# Patient Record
Sex: FEMALE | Race: WHITE | NOT HISPANIC OR LATINO | Employment: OTHER | URBAN - METROPOLITAN AREA
[De-identification: names, ages, dates, MRNs, and addresses within clinical notes are randomized per-mention and may not be internally consistent; named-entity substitution may affect disease eponyms.]

---

## 2024-05-14 NOTE — PROGRESS NOTES
Subjective   Patient ID: Fannie Hoang is a 83 y.o. female who presents for No chief complaint on file..  HPI  This 83-year-old female last seen in this office in May 2023 for sensorineural hearing loss is being seen in follow-up.  She also has some difficulties with ear canal dryness and also has a history of reactive airway problems.  Past audiograms had revealed a high-frequency hearing loss.She had no drop-off in her word understanding during conversations unless there is excessive background noise.  Review of Systems  A 12 point ROS has been reviewed and are negative for complaint except what is stated in the history of present illness and/or past medical history as noted in the EMR    Active Ambulatory Problems     Diagnosis Date Noted    No Active Ambulatory Problems     Resolved Ambulatory Problems     Diagnosis Date Noted    No Resolved Ambulatory Problems     Past Medical History:   Diagnosis Date    Calculus of kidney     Heart disease, unspecified     Iron deficiency     Personal history of malignant neoplasm of breast     Personal history of other diseases of the circulatory system     Personal history of other diseases of the digestive system     Personal history of other diseases of the musculoskeletal system and connective tissue     Personal history of other diseases of the respiratory system     Personal history of other diseases of the respiratory system 09/30/2016    Personal history of other diseases of urinary system     Personal history of other endocrine, nutritional and metabolic disease     Pure hypercholesterolemia, unspecified          No current outpatient medications on file.     Not on File   Objective   Physical Exam  EXAMINATION:     GENERAL GAIL.EARANCE: Alert, in no acute distress, normal pitch and clarity of voice, well-developed and nourished, cooperative.     HEAD/FACE: Normocephalic, atraumatic, normal facial movements and strength, no no tenderness to palpation, no lesions  noted.     SKIN: Normal turgor, no raised or ulcerative lesions, warm and dry to palpation.     EYES: Extraocular motions intact, no nystagmus noted, pupils equal and reactive to light and accommodation, no conjunctivitis.     EARS: Both ears--external ear anatomy is normal without lesions, auditory canals are patent and without skin abrasions or lesions, ear canals are devoid of wax and dry in appearance,,hearing is intact to voice, tympanic membranes are intact with no acute inflammation, light reflexes present, no effusions are noted and no mastoid tenderness found to palpation.     NOSE: No external skin lesions are noted, nares are patent, septum is intact with mild deviation to the lower, sinuses are nontender to palpation bilaterally, no intranasal lesions or inflammation is noted, nasal valve is normal.     OROPHARYNX/ORAL CAVITY: Oropharynx is not inflamed and is without lesions, mucosa of the oral cavity is intact and without lesions, tongue is midline and mobile, no acute dental disease is noted, TMJs are mobile     NECK: No lymphadenopathy is palpated, carotid pulses are intact, neck is supple with full range of motion, no thyroid abnormalities are noted, trachea is midline, no neck masses are palpated.     LYMPHATICS: No cervical adenopathy or supraclavicular adenopathy is palpated.     NEUROLOGIC/PSYCH; alert and oriented, cranial nerves are grossly intact, gait is without falling, no motor deficits are noted.     Her audiogram today revealed mild to low normal hearing up to 1000 Hz bilaterally then a moderate hearing loss is noted from 2 to 6000 Hz moderately severe in the 8000 Hz range.  Discrimination scores are 100% at 55 dB.  Tympanograms are normal.  Assessment/Plan   Problem List Items Addressed This Visit             ICD-10-CM    Chronic rhinitis J31.0    Ear canal dryness, bilateral H61.893    Sensorineural hearing loss, bilateral H90.3    Type 2 diabetes mellitus without complication (Multi)  E11.9     Other Visit Diagnoses         Codes    Sensorineural hearing loss (SNHL) of both ears    -  Primary H90.3             I discussed the present hearing test findings with the patient. Since the last test there has been mild change in the hearing of individual frequencies sound. Discrimination ability remains basically unchanged. It would be advised that a yearly audiogram be done unless symptoms develop in regards to progressive loss, new onset vertigo, or changes regarding tinnitus. Avoidance of loud noise without ear protection is advised. Rehabilitation using hearing aids was discussed as a treatment option.  She would benefit from seeing audiology for a discussion about hearing aids.  If she wishes to do so she can contact the office at any time otherwise she should be seen again in 1 year..    Sourav Rizo DMD, MD 05/14/24 11:57 AM

## 2024-05-16 ENCOUNTER — OFFICE VISIT (OUTPATIENT)
Dept: OTOLARYNGOLOGY | Facility: CLINIC | Age: 84
End: 2024-05-16
Payer: MEDICARE

## 2024-05-16 ENCOUNTER — CLINICAL SUPPORT (OUTPATIENT)
Dept: AUDIOLOGY | Facility: CLINIC | Age: 84
End: 2024-05-16
Payer: MEDICARE

## 2024-05-16 VITALS — BODY MASS INDEX: 28.35 KG/M2 | HEIGHT: 70 IN | WEIGHT: 198 LBS

## 2024-05-16 DIAGNOSIS — H61.893 EAR CANAL DRYNESS, BILATERAL: ICD-10-CM

## 2024-05-16 DIAGNOSIS — H90.3 SENSORINEURAL HEARING LOSS (SNHL) OF BOTH EARS: Primary | ICD-10-CM

## 2024-05-16 DIAGNOSIS — H90.3 SENSORINEURAL HEARING LOSS, BILATERAL: Primary | ICD-10-CM

## 2024-05-16 DIAGNOSIS — E11.9 TYPE 2 DIABETES MELLITUS WITHOUT COMPLICATION, WITHOUT LONG-TERM CURRENT USE OF INSULIN (MULTI): ICD-10-CM

## 2024-05-16 DIAGNOSIS — J31.0 CHRONIC RHINITIS: ICD-10-CM

## 2024-05-16 DIAGNOSIS — H90.3 SENSORINEURAL HEARING LOSS, BILATERAL: ICD-10-CM

## 2024-05-16 PROBLEM — I21.9 MYOCARDIAL INFARCTION (MULTI): Status: ACTIVE | Noted: 2022-12-22

## 2024-05-16 PROBLEM — I10 ESSENTIAL (PRIMARY) HYPERTENSION: Status: ACTIVE | Noted: 2022-12-22

## 2024-05-16 PROBLEM — G93.32 CHRONIC FATIGUE SYNDROME: Status: ACTIVE | Noted: 2024-05-16

## 2024-05-16 PROBLEM — J30.1 CHRONIC SEASONAL ALLERGIC RHINITIS DUE TO POLLEN: Status: ACTIVE | Noted: 2018-08-08

## 2024-05-16 PROBLEM — H10.31 ACUTE BACTERIAL CONJUNCTIVITIS OF RIGHT EYE: Status: ACTIVE | Noted: 2024-05-16

## 2024-05-16 PROBLEM — J45.30 MILD PERSISTENT ASTHMA WITHOUT COMPLICATION (HHS-HCC): Status: ACTIVE | Noted: 2018-08-08

## 2024-05-16 PROBLEM — D63.1 ANEMIA OF CHRONIC RENAL FAILURE, STAGE 3 (MODERATE) (MULTI): Status: ACTIVE | Noted: 2017-10-02

## 2024-05-16 PROBLEM — M17.0 OSTEOARTHRITIS OF BOTH KNEES: Status: ACTIVE | Noted: 2017-10-02

## 2024-05-16 PROBLEM — A41.9 SEPSIS (MULTI): Status: ACTIVE | Noted: 2024-05-16

## 2024-05-16 PROBLEM — K81.9 CHOLECYSTITIS: Status: ACTIVE | Noted: 2022-12-22

## 2024-05-16 PROBLEM — E78.5 HYPERLIPIDEMIA: Status: ACTIVE | Noted: 2022-12-22

## 2024-05-16 PROBLEM — J30.89 ALLERGIC RHINITIS DUE TO HOUSE DUST MITE: Status: ACTIVE | Noted: 2018-08-08

## 2024-05-16 PROBLEM — H60.60 CHRONIC OTITIS EXTERNA: Status: ACTIVE | Noted: 2024-05-16

## 2024-05-16 PROBLEM — I25.10 CORONARY ARTERY DISEASE INVOLVING NATIVE CORONARY ARTERY OF NATIVE HEART WITHOUT ANGINA PECTORIS: Status: ACTIVE | Noted: 2019-05-20

## 2024-05-16 PROBLEM — T78.3XXA ANGIO-EDEMA: Status: ACTIVE | Noted: 2018-08-08

## 2024-05-16 PROBLEM — D47.2 MONOCLONAL GAMMOPATHY: Status: ACTIVE | Noted: 2017-10-02

## 2024-05-16 PROBLEM — J45.909 ASTHMATIC BRONCHITIS (HHS-HCC): Status: ACTIVE | Noted: 2024-05-16

## 2024-05-16 PROBLEM — N18.30 ANEMIA OF CHRONIC RENAL FAILURE, STAGE 3 (MODERATE) (MULTI): Status: ACTIVE | Noted: 2017-10-02

## 2024-05-16 PROBLEM — A09 DIARRHEA OF INFECTIOUS ORIGIN: Status: ACTIVE | Noted: 2024-05-16

## 2024-05-16 PROBLEM — I35.0 AORTIC STENOSIS: Status: ACTIVE | Noted: 2024-05-16

## 2024-05-16 PROBLEM — E11.65 TYPE 2 DIABETES MELLITUS WITH HYPERGLYCEMIA (MULTI): Status: ACTIVE | Noted: 2024-05-16

## 2024-05-16 PROBLEM — C50.919 MALIGNANT NEOPLASM OF BREAST (MULTI): Status: ACTIVE | Noted: 2024-05-16

## 2024-05-16 PROBLEM — Z98.890 STATUS POST AORTIC VALVE REPAIR: Status: ACTIVE | Noted: 2024-05-16

## 2024-05-16 PROBLEM — K58.0 IRRITABLE BOWEL SYNDROME WITH DIARRHEA: Status: ACTIVE | Noted: 2024-05-16

## 2024-05-16 PROBLEM — I65.29 STENOSIS OF CAROTID ARTERY: Status: ACTIVE | Noted: 2022-12-22

## 2024-05-16 PROBLEM — I50.30 DIASTOLIC HEART FAILURE (MULTI): Status: ACTIVE | Noted: 2024-05-16

## 2024-05-16 PROBLEM — I25.2 HISTORY OF NON-ST ELEVATION MYOCARDIAL INFARCTION (NSTEMI): Status: ACTIVE | Noted: 2024-05-16

## 2024-05-16 PROBLEM — K80.20 CHOLELITHIASIS: Status: ACTIVE | Noted: 2022-12-22

## 2024-05-16 PROBLEM — Z85.3 HISTORY OF RIGHT BREAST CANCER: Status: ACTIVE | Noted: 2024-05-16

## 2024-05-16 PROBLEM — B37.31 YEAST VAGINITIS: Status: ACTIVE | Noted: 2024-05-16

## 2024-05-16 PROCEDURE — 1159F MED LIST DOCD IN RCRD: CPT | Performed by: OTOLARYNGOLOGY

## 2024-05-16 PROCEDURE — 99214 OFFICE O/P EST MOD 30 MIN: CPT | Performed by: OTOLARYNGOLOGY

## 2024-05-16 PROCEDURE — 92567 TYMPANOMETRY: CPT | Performed by: AUDIOLOGIST

## 2024-05-16 PROCEDURE — 92557 COMPREHENSIVE HEARING TEST: CPT | Performed by: AUDIOLOGIST

## 2024-05-16 PROCEDURE — 1159F MED LIST DOCD IN RCRD: CPT | Performed by: AUDIOLOGIST

## 2024-05-16 PROCEDURE — 1160F RVW MEDS BY RX/DR IN RCRD: CPT | Performed by: OTOLARYNGOLOGY

## 2024-05-16 PROCEDURE — 1036F TOBACCO NON-USER: CPT | Performed by: AUDIOLOGIST

## 2024-05-16 PROCEDURE — 1160F RVW MEDS BY RX/DR IN RCRD: CPT | Performed by: AUDIOLOGIST

## 2024-05-16 PROCEDURE — 1036F TOBACCO NON-USER: CPT | Performed by: OTOLARYNGOLOGY

## 2024-05-16 RX ORDER — ATORVASTATIN CALCIUM 40 MG/1
40 TABLET, FILM COATED ORAL DAILY
COMMUNITY
Start: 2016-12-14

## 2024-05-16 RX ORDER — NAPROXEN SODIUM 220 MG/1
81 TABLET, FILM COATED ORAL DAILY
COMMUNITY

## 2024-05-16 RX ORDER — UBIDECARENONE 75 MG
500 CAPSULE ORAL EVERY 12 HOURS
COMMUNITY
Start: 2016-09-30

## 2024-05-16 RX ORDER — SITAGLIPTIN AND METFORMIN HYDROCHLORIDE 500; 50 MG/1; MG/1
1 TABLET, FILM COATED ORAL
COMMUNITY

## 2024-05-16 RX ORDER — PHENYLEPHRINE HCL 10 MG
600 TABLET ORAL
COMMUNITY

## 2024-05-16 RX ORDER — LANOLIN ALCOHOL/MO/W.PET/CERES
400 CREAM (GRAM) TOPICAL DAILY
COMMUNITY
Start: 2016-09-30

## 2024-05-16 RX ORDER — VIT C/E/ZN/COPPR/LUTEIN/ZEAXAN 250MG-90MG
2000 CAPSULE ORAL
COMMUNITY

## 2024-05-16 RX ORDER — AMOXICILLIN 500 MG/1
500 CAPSULE ORAL
COMMUNITY
Start: 2023-12-18

## 2024-05-16 RX ORDER — LOSARTAN POTASSIUM 25 MG/1
25 TABLET ORAL DAILY
COMMUNITY

## 2024-05-16 RX ORDER — METOPROLOL TARTRATE 25 MG/1
25 TABLET, FILM COATED ORAL 2 TIMES DAILY
COMMUNITY

## 2024-05-16 RX ORDER — FERROUS SULFATE 324(65)MG
65 TABLET, DELAYED RELEASE (ENTERIC COATED) ORAL
COMMUNITY

## 2024-05-16 RX ORDER — SOY PROTEIN
2 POWDER (GRAM) ORAL 2 TIMES DAILY
COMMUNITY

## 2024-05-16 RX ORDER — FLUTICASONE PROPIONATE AND SALMETEROL 250; 50 UG/1; UG/1
1 POWDER RESPIRATORY (INHALATION)
COMMUNITY

## 2024-05-16 NOTE — PROGRESS NOTES
[facility]UDIOLOGY  AUDIOMETRIC EVALUATION      Name:  Fannie Hoang   :  1940  Age:  83 y.o.  Date of Evaluation:  24    HISTORY    Fannie Hoang is seen today at the request of Sourav Rizo DMD, MD, FACS. Patient stated that she usually only has difficulty hearing in restaurants. She feels that her hearing has remained stable since her last evaluation 5-15-23.    EVALUATION  See Audiogram attached to the end of this note    RESULTS    Otoscopic Evaluation:  Right Ear:  clear canal  Left Ear:  clear canal    Tympanometry:    Right Ear: Type A-consistent with normal eardrum mobility and middle ear pressure    Left Ear:  Type A-consistent with normal eardrum mobility and middle ear pressure      Pure Tone Audiometry:    Right Ear:  mild loss rising to hearing within normal limits at 1000 Hz. before sloping to a severe sensorineuralhearing loss  Left Ear:  mild loss rising to hearing within normal limits at 1000 Hz, before sloping to a severe sensorineural hearing loss       Speech Audiometry:     Right Ear:  excellent in quiet when words were presented at 55 dBHL  Left Ear:  excellent in quiet when words were presented at 55 dBHL  Speech reception thresholds were in good agreement with pure tone testing.    DISCUSSION  Results were relayed to Sourav Rizo DMD, MD, FACS    APPOINTMENT TIME  10:00 - 10:30 AM      Anne Marie Chris MA, CCC/A  Licensed Audiologist

## 2024-08-30 ENCOUNTER — APPOINTMENT (OUTPATIENT)
Dept: AUDIOLOGY | Facility: CLINIC | Age: 84
End: 2024-08-30